# Patient Record
Sex: FEMALE | Race: ASIAN
[De-identification: names, ages, dates, MRNs, and addresses within clinical notes are randomized per-mention and may not be internally consistent; named-entity substitution may affect disease eponyms.]

---

## 2020-07-18 ENCOUNTER — HOSPITAL ENCOUNTER (EMERGENCY)
Dept: HOSPITAL 76 - ED | Age: 25
Discharge: HOME | End: 2020-07-18
Payer: COMMERCIAL

## 2020-07-18 VITALS — SYSTOLIC BLOOD PRESSURE: 148 MMHG | DIASTOLIC BLOOD PRESSURE: 78 MMHG

## 2020-07-18 DIAGNOSIS — R07.89: Primary | ICD-10-CM

## 2020-07-18 LAB
ALBUMIN DIAFP-MCNC: 4.3 G/DL (ref 3.2–5.5)
ALBUMIN/GLOB SERPL: 1.5 {RATIO} (ref 1–2.2)
ALP SERPL-CCNC: 64 IU/L (ref 42–121)
ALT SERPL W P-5'-P-CCNC: 56 IU/L (ref 10–60)
ANION GAP SERPL CALCULATED.4IONS-SCNC: 6 MMOL/L (ref 6–13)
AST SERPL W P-5'-P-CCNC: 36 IU/L (ref 10–42)
BASOPHILS NFR BLD AUTO: 0 10^3/UL (ref 0–0.1)
BASOPHILS NFR BLD AUTO: 0.4 %
BILIRUB BLD-MCNC: 0.8 MG/DL (ref 0.2–1)
BUN SERPL-MCNC: 11 MG/DL (ref 6–20)
CALCIUM UR-MCNC: 9.2 MG/DL (ref 8.5–10.3)
CHLORIDE SERPL-SCNC: 102 MMOL/L (ref 101–111)
CO2 SERPL-SCNC: 29 MMOL/L (ref 21–32)
CREAT SERPLBLD-SCNC: 0.7 MG/DL (ref 0.4–1)
EOSINOPHIL # BLD AUTO: 0.2 10^3/UL (ref 0–0.7)
EOSINOPHIL NFR BLD AUTO: 2.4 %
ERYTHROCYTE [DISTWIDTH] IN BLOOD BY AUTOMATED COUNT: 13 % (ref 12–15)
GLOBULIN SER-MCNC: 2.8 G/DL (ref 2.1–4.2)
GLUCOSE SERPL-MCNC: 141 MG/DL (ref 70–100)
HGB UR QL STRIP: 14.7 G/DL (ref 12–16)
LIPASE SERPL-CCNC: 36 U/L (ref 22–51)
LYMPHOCYTES # SPEC AUTO: 2.1 10^3/UL (ref 1.5–3.5)
LYMPHOCYTES NFR BLD AUTO: 28.5 %
MCH RBC QN AUTO: 30.7 PG (ref 27–31)
MCHC RBC AUTO-ENTMCNC: 33.9 G/DL (ref 32–36)
MCV RBC AUTO: 90.6 FL (ref 81–99)
MONOCYTES # BLD AUTO: 0.5 10^3/UL (ref 0–1)
MONOCYTES NFR BLD AUTO: 6.2 %
NEUTROPHILS # BLD AUTO: 4.6 10^3/UL (ref 1.5–6.6)
NEUTROPHILS # SNV AUTO: 7.4 X10^3/UL (ref 4.8–10.8)
NEUTROPHILS NFR BLD AUTO: 62 %
PDW BLD AUTO: 9.7 FL (ref 7.9–10.8)
PLATELET # BLD: 241 10^3/UL (ref 130–450)
PROT SPEC-MCNC: 7.1 G/DL (ref 6.7–8.2)
RBC MAR: 4.79 10^6/UL (ref 4.2–5.4)
SODIUM SERPLBLD-SCNC: 137 MMOL/L (ref 135–145)

## 2020-07-18 PROCEDURE — 36415 COLL VENOUS BLD VENIPUNCTURE: CPT

## 2020-07-18 PROCEDURE — 80053 COMPREHEN METABOLIC PANEL: CPT

## 2020-07-18 PROCEDURE — 85025 COMPLETE CBC W/AUTO DIFF WBC: CPT

## 2020-07-18 PROCEDURE — 83690 ASSAY OF LIPASE: CPT

## 2020-07-18 PROCEDURE — 99284 EMERGENCY DEPT VISIT MOD MDM: CPT

## 2020-07-18 PROCEDURE — 93005 ELECTROCARDIOGRAM TRACING: CPT

## 2020-07-18 PROCEDURE — 71045 X-RAY EXAM CHEST 1 VIEW: CPT

## 2020-07-18 PROCEDURE — 84484 ASSAY OF TROPONIN QUANT: CPT

## 2020-07-18 NOTE — XRAY REPORT
PROCEDURE:  Chest 1 View X-Ray

 

INDICATIONS:  Chest Pain

 

TECHNIQUE:  One view of the chest was acquired.  

 

COMPARISON:  None available.

 

FINDINGS:  

 

Surgical changes and devices:  None.  

 

Lungs and pleura:  No pleural effusions or pneumothorax.  Lungs are clear.  

 

Mediastinum:  Mediastinal contours appear normal.  Heart size is normal.  

 

Bones and chest wall:  No suspicious bony lesions.  Overlying soft tissues appear unremarkable.  

 

 

 

IMPRESSION:  

 

Portable chest within normal limits for age.  

 

Reviewed by: Santos Quiroga MD on 7/18/2020 1:11 PM AKDT

Approved by: Santos Quiroga MD on 7/18/2020 1:11 PM AK[facility]

 

 

Station ID:  [id number]

## 2020-07-18 NOTE — ED PHYSICIAN DOCUMENTATION
PD HPI CHEST PAIN





- Stated complaint


Stated Complaint: CHEST PAIN





- Chief complaint


Chief Complaint: Cardiac





- History obtained from


History obtained from: Patient





- Additional information


Additional information: 





For the last 2 months she wakes up sometimes with chest pain.  She notes that it

seems to be Usually after waking up from the right lateral decubitus position.  

It central nonradiating chest pain.  It does worsen with deep breathing and she 

notes that it gets worse if she crosses her arms in front of her chest.  She 

denies pedal edema or calf pain.  No personal or family history of coronary 

disease that she knows of, noting that her parents are "secretive about their 

health."  For the last 2 months it usually 2 times a week but today it is 

lasting longer than previously.





Review of Systems


Constitutional: reports: Reviewed and negative


Ears: reports: Reviewed and negative


Nose: reports: Reviewed and negative


Throat: reports: Reviewed and negative


Cardiac: reports: Chest pain / pressure.  denies: Palpitations, Pedal edema, 

Calf pain


Respiratory: reports: Dyspnea





PD PAST MEDICAL HISTORY





- Present Medications


Home Medications: 


                                Ambulatory Orders











 Medication  Instructions  Recorded  Confirmed


 


Ibuprofen [Motrin] 800 mg PO Q8H PRN #30 tablet 07/18/20 














- Allergies


Allergies/Adverse Reactions: 


                                    Allergies











Allergy/AdvReac Type Severity Reaction Status Date / Time


 


No Known Drug Allergies Allergy   Verified 07/18/20 13:35














PD ED PE NORMAL





- Vitals


Vital signs reviewed: Yes





- General


General: Alert and oriented X 3, No acute distress





- HEENT


HEENT: PERRL, EOMI





- Neck


Neck: Supple, no meningeal sign, No bony TTP





- Cardiac


Cardiac: RRR, No murmur, Other (Pain is reproduced with palpation of the upper 

sternum)





- Respiratory


Respiratory: Clear bilaterally





- Abdomen


Abdomen: Non tender





- Extremities


Extremities: No edema, No calf tenderness / cord





- Neuro


Neuro: Alert and oriented X 3, Normal speech





Results





- Vitals


Vitals: 


                               Vital Signs - 24 hr











  07/18/20 07/18/20





  13:35 14:22


 


Temperature 36.9 C 


 


Heart Rate 98 90


 


Respiratory 18 16





Rate  


 


Blood Pressure 154/97 H 148/78 H


 


O2 Saturation 98 98








                                     Oxygen











O2 Source                      Room air

















- EKG (time done)


  ** 1342


Rate: Rate (enter#) (78)


Rhythm: NSR


Axis: Normal


Intervals: Normal AL


QRS: Normal


Ischemia: Normal ST segments


Computer interpretation: Agree with computer





- Labs


Labs: 


                                Laboratory Tests











  07/18/20 07/18/20 07/18/20





  14:23 14:23 14:23


 


WBC  7.4  


 


RBC  4.79  


 


Hgb  14.7  


 


Hct  43.4  


 


MCV  90.6  


 


MCH  30.7  


 


MCHC  33.9  


 


RDW  13.0  


 


Plt Count  241  


 


MPV  9.7  


 


Neut # (Auto)  4.6  


 


Lymph # (Auto)  2.1  


 


Mono # (Auto)  0.5  


 


Eos # (Auto)  0.2  


 


Baso # (Auto)  0.0  


 


Absolute Nucleated RBC  0.00  


 


Nucleated RBC %  0.0  


 


Sodium   137 


 


Potassium   3.3 L 


 


Chloride   102 


 


Carbon Dioxide   29 


 


Anion Gap   6.0 


 


BUN   11 


 


Creatinine   0.7 


 


Estimated GFR (MDRD)   100 


 


Glucose   141 H 


 


Calcium   9.2 


 


Total Bilirubin   0.8 


 


AST   36 


 


ALT   56 


 


Alkaline Phosphatase   64 


 


Troponin I High Sens    2.5


 


Total Protein   7.1 


 


Albumin   4.3 


 


Globulin   2.8 


 


Albumin/Globulin Ratio   1.5 


 


Lipase   36 














- Rads (name of study)


  ** 1v chest


Radiology: EMP read contemporaneously (normal)





PD MEDICAL DECISION MAKING





- ED course


ED course: 





This is a 25-year-old woman with reproducible noncardiac chest pain, 

nonexertional, reproducible also with palpation but also crossing her arms in 

front of her chest.  This combined with a normal EKG and otherwise no risk 

factors gives her a heart score of 0.





Departure





- Departure


Disposition: 01 Home, Self Care


Clinical Impression: 


 Chest wall pain





Condition: Good


Record reviewed to determine appropriate education?: Yes


Instructions:  ED Chest Pain Costochondritis


Prescriptions: 


Ibuprofen [Motrin] 800 mg PO Q8H PRN #30 tablet


 PRN Reason: PAIN &/OR FEVER


Comments: 


All of your heart testing is perfectly normal.  As discussed from your symptoms 

and exam this seems consistent with pain from the anterior chest wall as opposed

 to a deeper or more important structure.  Ibuprofen as needed for pain, do some

 gentle stretching with the chest and shoulders every day.  You can apply heat 

as needed.  Follow-up with your doctor and return if worse.

## 2020-11-05 ENCOUNTER — HOSPITAL ENCOUNTER (EMERGENCY)
Dept: HOSPITAL 76 - ED | Age: 25
Discharge: HOME | End: 2020-11-05
Payer: COMMERCIAL

## 2020-11-05 VITALS — SYSTOLIC BLOOD PRESSURE: 149 MMHG | DIASTOLIC BLOOD PRESSURE: 94 MMHG

## 2020-11-05 DIAGNOSIS — G89.29: ICD-10-CM

## 2020-11-05 DIAGNOSIS — M54.5: Primary | ICD-10-CM

## 2020-11-05 PROCEDURE — 99284 EMERGENCY DEPT VISIT MOD MDM: CPT

## 2020-11-05 PROCEDURE — 99283 EMERGENCY DEPT VISIT LOW MDM: CPT

## 2020-11-05 NOTE — ED PHYSICIAN DOCUMENTATION
PD HPI BACK PAIN





- Stated complaint


Stated Complaint: LOWER BACK PX





- Chief complaint


Chief Complaint: Back Pain





- History obtained from


History obtained from: Patient





- History of Present Illness


Timing - onset: How many days ago


Location: Lower


Quality: Pain, Sharp, Aching


Associated symptoms: Numbness (down right leg at times, not continual.).  No: 

Fever, Weakness


Improves with: Rest


Worsened by: Movement


Contributing factors: Lifting, Twisting.  No: Trauma


Similar symptoms before: No diagnosis (some pains in back for couple years 

intermittent and then more consistent the past couple of months. Seen by PCP and

Dx with presumed "pinched nerve" and Rx with PT and chiropractic, but not 

improved. Still does back exercises regularly.)


Recently seen: Clinic (few weeks ago with NSAIDs Rx and some more physical 

therapy ordered.)





Review of Systems


Constitutional: denies: Fever, Chills


Nose: denies: Rhinorrhea / runny nose, Congestion


Throat: denies: Sore throat


Respiratory: denies: Cough


GI: denies: Abdominal Pain, Nausea, Vomiting, Bloody / black stool


: denies: Incontinent


Skin: denies: Rash, Lesions


Musculoskeletal: reports: Back pain.  denies: Neck pain


Neurologic: denies: Focal weakness, Numbness





PD PAST MEDICAL HISTORY





- Past Medical History


Past Medical History: Yes


Cardiovascular: None


Respiratory: None


Neuro: None


Endocrine/Autoimmune: None


GI: None


GYN: None


: None


HEENT: None


Psych: None


Musculoskeletal: None, Chronic back pain (intermittent for couple years, and 

more consistent the past 1-2 months.)


Derm: None





- Past Surgical History


Past Surgical History: No





- Present Medications


Home Medications: 


                                Ambulatory Orders











 Medication  Instructions  Recorded  Confirmed


 


Ibuprofen [Motrin] 800 mg PO Q8H PRN #30 tablet 07/18/20 


 


HYDROcod/ACETAM 5/325 [Norco 5/325] 1 ea PO Q6H PRN #18 tablet 11/05/20 


 


Naproxen 375 mg PO BID #20 tablet. 11/05/20 


 


tiZANidine [Zanaflex] 4 mg PO Q8H PRN #25 tablet 11/05/20 














- Allergies


Allergies/Adverse Reactions: 


                                    Allergies











Allergy/AdvReac Type Severity Reaction Status Date / Time


 


No Known Drug Allergies Allergy   Verified 11/05/20 08:49














- Social History


Does the pt smoke?: No


Smoking Status: Never smoker


Does the pt have substance abuse?: No





- Immunizations


Immunizations are current?: Yes





PD ED PE NORMAL





- Vitals


Vital signs reviewed: Yes





- General


General: Alert and oriented X 3, No acute distress (but has guarded ROM of the 

lumbar area. ), Well developed/nourished





- Cardiac


Cardiac: RRR, No murmur





- Respiratory


Respiratory: No respiratory distress





- Abdomen


Abdomen: Soft, Non tender





- Derm


Derm: Normal color, Warm and dry, No rash





- Extremities


Extremities: No tenderness to palpate, Normal ROM s pain, No edema, No calf 

tenderness / cord





- Neuro


Neuro: Alert and oriented X 3, No motor deficit, No sensory deficit, Normal 

speech


Eye Opening: Spontaneous


Motor: Obeys Commands


Verbal: Oriented


GCS Score: 15





Results





- Vitals


Vitals: 


                               Vital Signs - 24 hr











  11/05/20 11/05/20





  08:44 10:35


 


Temperature 36.6 C 36.3 C L


 


Heart Rate 88 88


 


Respiratory 18 18





Rate  


 


Blood Pressure 148/98 H 149/94 H


 


O2 Saturation 99 99








                                     Oxygen











O2 Source                      Room air

















PD MEDICAL DECISION MAKING





- ED course


Complexity details: considered differential (no red flags to suggest need for 

imaging/tests. ), d/w patient





Departure





- Departure


Disposition: 01 Home, Self Care


Clinical Impression: 


 Acute exacerbation of chronic low back pain





Condition: Stable


Record reviewed to determine appropriate education?: Yes


Instructions:  ED Low Back Pain Injury


Follow-Up: 


[facility] [place] [Provider Group]


Prescriptions: 


Naproxen 375 mg PO BID #20 tablet.


HYDROcod/ACETAM 5/325 [Norco 5/325] 1 ea PO Q6H PRN #18 tablet


 PRN Reason: Pain


tiZANidine [Zanaflex] 4 mg PO Q8H PRN #25 tablet


 PRN Reason: Spasms


Comments: 


Continue with the therapy exercises and stretching that you have been doing.  

Heat periodically to the low back.





Add naproxen anti-inflammatory twice daily with food for the next 7 to 10 days. 

 To that add tizanidine if needed for spasm and stiffness.





To that add Tylenol 500 mg 4 times a day or hydrocodone if needed for worse 

pain, particularly at night.





Follow-up with your primary care regarding further treatment options and 

discussion of whether imaging such as MRI would be appropriate since you have 

ongoing pain and have tried the physical therapy and other modalities.


Forms:  Activity restrictions


Discharge Date/Time: 11/05/20 10:47

## 2020-11-25 ENCOUNTER — HOSPITAL ENCOUNTER (OUTPATIENT)
Dept: HOSPITAL 76 - DI | Age: 25
Discharge: HOME | End: 2020-11-25
Attending: HEALTH CARE PROVIDER
Payer: COMMERCIAL

## 2020-11-25 DIAGNOSIS — M48.07: ICD-10-CM

## 2020-11-25 DIAGNOSIS — M51.27: Primary | ICD-10-CM

## 2020-11-25 PROCEDURE — 72148 MRI LUMBAR SPINE W/O DYE: CPT

## 2020-11-25 NOTE — MRI REPORT
PROCEDURE:  Lumbar Spine W/O

 

INDICATIONS:  LOW BACK PAIN

 

TECHNIQUE:  

Noncontrast sagittal T1 spin echo and T2 fast echo, sagittal STIR, axial T1 and T2 fast spin echo thr
ough the lumbar spine.  In cases with scoliosis, additional coronal T2 fast spin echo may be performe
d.  

 

COMPARISON:  None.

 

FINDINGS:  

Image quality:  Excellent.  

 

Alignment and Curvature:  There is normal bony alignment.  In the absence of plain films for comparis
on it is assumed that there are 5 nonrib-bearing lumbar vertebral bodies, and that axial imaging was 
obtained from T12/L1 through L5/S1

 

Bone Marrow:  Marrow is of normal overall signal.  No acute vertebral body compression fractures.  

 

Spinal Cord:  Conus medullaris terminates at the L1 level.  Visualized cord demonstrates normal signa
l and size.  

 

Paraspinous Soft Tissues:  No paravertebral masses.  

 

T12-L1:  No canal stenosis or foraminal stenosis.  

 

L1-L2:    No canal stenosis or foraminal stenosis. 

 

L2-L3:    No canal stenosis or foraminal stenosis. 

 

L3-L4:   No canal stenosis or foraminal stenosis. 

 

L4-L5:   No canal stenosis or foraminal stenosis. 

 

L5-S1:   There is a moderate broad-based posterior disc protrusion, eccentric to the right. The right
 S1 nerve root is impinged in the right lateral recess. The left S1 nerve root is mildly posteriorly 
deviated in the left lateral recess. There is moderate resultant central canal stenosis. There is mil
d bilateral foraminal narrowing.  

 

IMPRESSION:  

1. Please note that the numbering system assumes 5 nonrib-bearing vertebral bodies and that axial love
ging was obtained from T12 L1-L5 S1. Recommend careful correlation with landmarks if surgery is plann
ed.

2. At L5-S1, there is a moderate broad-based posterior disc protrusion, eccentric to the right. There
 is impingement of the right S1 nerve root in the right lateral recess. There is moderate central can
al stenosis. The left S1 nerve root is mildly posteriorly deviated in the left lateral recess.

 

Reviewed by: Artem Perkins MD on 11/25/2020 1:48 PM PST

Approved by: Artem Perkins MD on 11/25/2020 1:48 PM PST

 

 

Station ID:  [id number]

## 2023-05-20 ENCOUNTER — HOSPITAL ENCOUNTER (EMERGENCY)
Dept: HOSPITAL 76 - ED | Age: 28
LOS: 1 days | Discharge: HOME | End: 2023-05-21
Payer: COMMERCIAL

## 2023-05-20 VITALS — DIASTOLIC BLOOD PRESSURE: 105 MMHG | SYSTOLIC BLOOD PRESSURE: 177 MMHG

## 2023-05-20 DIAGNOSIS — J06.9: Primary | ICD-10-CM

## 2023-05-20 DIAGNOSIS — Z20.822: ICD-10-CM

## 2023-05-20 PROCEDURE — 87635 SARS-COV-2 COVID-19 AMP PRB: CPT

## 2023-05-20 PROCEDURE — 36415 COLL VENOUS BLD VENIPUNCTURE: CPT

## 2023-05-20 PROCEDURE — 94640 AIRWAY INHALATION TREATMENT: CPT

## 2023-05-20 PROCEDURE — 99283 EMERGENCY DEPT VISIT LOW MDM: CPT

## 2023-05-20 PROCEDURE — 71045 X-RAY EXAM CHEST 1 VIEW: CPT

## 2023-05-20 PROCEDURE — 99284 EMERGENCY DEPT VISIT MOD MDM: CPT

## 2023-05-20 NOTE — XRAY REPORT
PROCEDURE:  Chest 1 View X-Ray

 

INDICATIONS:  cough x 1 week

 

TECHNIQUE:  One view of the chest was acquired.  

 

COMPARISON:  None.

 

FINDINGS:  

 

Surgical changes and devices:  None.  

 

Lungs and pleura:  No pleural effusions or pneumothorax.  Lungs are clear.  

 

Mediastinum:  Mediastinal contours appear normal.  Heart size is normal.  

 

Bones and chest wall:  No suspicious bony lesions.  Overlying soft tissues appear unremarkable.  

 

 

IMPRESSION:  

 

No acute cardiopulmonary process.

 

Mildly reduced inspiratory volume, no evidence of pneumonia.

 

Reviewed by: Justin Tipton MD on 5/20/2023 11:01 PM PDT

Approved by: Justin Tipton MD on 5/20/2023 11:01 PM PDT

 

 

Station ID:  [id number]

## 2023-05-21 NOTE — ED PHYSICIAN DOCUMENTATION
PD HPI URI





- Stated complaint


Stated Complaint: COUGH





- Chief complaint


Chief Complaint: Resp





- History obtained from


History obtained from: Patient





- Additional information


Additional information: 


Patient is a 28-year-old female presenting for evaluation of productive cough 

for the past 1 week.  Patient states that the cough is productive of green to 

yellow sputum.  She denies hemoptysis.  Denies chest pain or shortness of 

breath.  No fevers.  Her  is also ill with URI symptoms.  She thought it 

could be related to allergies so has been using allergy medication without 

improvement.  She states that she feels like she is wheezing at times.  Denies a

history of asthma and does not smoke. Denies a history of PE or DVT.No recent 

travel or immobilization.  No leg swelling or pain.





Review of Systems


Constitutional: denies: Fever


Cardiac: denies: Chest pain / pressure


Respiratory: reports: Cough.  denies: Dyspnea


GI: denies: Abdominal Pain, Vomiting


Musculoskeletal: denies: Extremity swelling


Neurologic: denies: Headache





PD PAST MEDICAL HISTORY





- Past Medical History


Cardiovascular: None


Respiratory: None


Neuro: None


Endocrine/Autoimmune: None


GI: None


GYN: None


: None


HEENT: None


Psych: None


Musculoskeletal: None, Chronic back pain (intermittent for couple years, and 

more consistent the past 1-2 months.)


Derm: None





- Past Surgical History


Past Surgical History: No





- Present Medications


Home Medications: 


                                Ambulatory Orders











 Medication  Instructions  Recorded  Confirmed


 


Albuterol Sulf [Ventolin Hfa 1 - 2 puffs INH Q4HR PRN #1 each 05/21/23 





Inhaler]   


 


Benzonatate [Tessalon] 200 mg PO QID PRN #20 cap 05/21/23 














- Allergies


Allergies/Adverse Reactions: 


                                    Allergies











Allergy/AdvReac Type Severity Reaction Status Date / Time


 


No Known Drug Allergies Allergy   Verified 05/20/23 22:11














- Social History


Does the pt smoke?: No


Smoking Status: Never smoker


Does the pt have substance abuse?: No





- Immunizations


Immunizations are current?: Yes





PD ED PE NORMAL





- General


General: Alert and oriented X 3, No acute distress, Well developed/nourished





- HEENT


HEENT: Atraumatic, Moist mucous membranes, Pharynx benign, Other (No sinus ten

derness)





- Neck


Neck: Supple, no meningeal sign





- Cardiac


Cardiac: RRR, No murmur





- Respiratory


Respiratory: No respiratory distress, Clear bilaterally, Other (Diminished at 

the bases)





- Abdomen


Abdomen: Soft, Non tender





- Derm


Derm: Warm and dry





- Extremities


Extremities: No edema





- Neuro


Neuro: Normal speech





Results





- Vitals


Vitals: 


                               Vital Signs - 24 hr











  05/20/23 05/20/23





  22:08 23:45


 


Temperature 35.6 C L 


 


Heart Rate 105 H 95


 


Respiratory 19 16





Rate  


 


Blood Pressure 177/105 H 


 


O2 Saturation 97 








                                     Oxygen











O2 Source                      Room air

















- Labs


Labs: 


                                Laboratory Tests











  05/20/23





  22:41


 


SARS-CoV-2 (PCR)  NOT DETECTED














PD Medical Decision Making





- ED course


Complexity details: reviewed results, re-evaluated patient, d/w patient


ED course: 


Patient presenting for evaluation of URI symptoms for the past month with 

productive cough.  Her heart rate is slightly elevated at triage but otherwise 

vital signs appear stable and her heart rate appeared to improve without any 

specific intervention on my exam. No known risk factors for PE and symptoms 

sound infectious in etiology.X-ray was obtained which I reviewed and I see no 

signs of infiltrate or consolidation or effusion.  COVID test is negative.  

Patient feeling better after DuoNeb treatment.Discussed that her symptoms are 

likely related to a viral illness.  Will prescribe albuterol inhaler and 

Tessalon Perles for symptoms.  Patient counseled on need for close follow-up 

with PCP if symptoms or not improving as well as concerning symptoms to return 

for.








Departure





- Departure


Disposition: 01 Home, Self Care


Clinical Impression: 


 Viral URI with cough





Condition: Stable


Instructions:  ED Viral Syndrome


Prescriptions: 


Albuterol Sulf [Ventolin Hfa Inhaler] 1 - 2 puffs INH Q4HR PRN #1 each


 PRN Reason: Shortness Of Air/Wheezing


Benzonatate [Tessalon] 200 mg PO QID PRN #20 cap


 PRN Reason: Cough


Comments: 


Your chest x-ray does not show pneumonia.  Your COVID test is pending.  Your 

symptoms are likely related to a viral illness.  I have sent prescriptions for 

cough medicine and an inhaler to [facility] in [place].  I would recommend 

close follow-up with your PCP if your symptoms or not improving.  Return to the 

emergency department with any worsening such as shortness of breath.


Discharge Date/Time: 05/21/23 00:18